# Patient Record
Sex: FEMALE | Race: OTHER | HISPANIC OR LATINO | URBAN - METROPOLITAN AREA
[De-identification: names, ages, dates, MRNs, and addresses within clinical notes are randomized per-mention and may not be internally consistent; named-entity substitution may affect disease eponyms.]

---

## 2021-08-14 ENCOUNTER — EMERGENCY (EMERGENCY)
Facility: HOSPITAL | Age: 21
LOS: 1 days | Discharge: HOME | End: 2021-08-14
Attending: EMERGENCY MEDICINE
Payer: SELF-PAY

## 2021-08-14 VITALS
DIASTOLIC BLOOD PRESSURE: 69 MMHG | RESPIRATION RATE: 14 BRPM | HEART RATE: 76 BPM | SYSTOLIC BLOOD PRESSURE: 100 MMHG | TEMPERATURE: 98 F | OXYGEN SATURATION: 99 %

## 2021-08-14 VITALS
DIASTOLIC BLOOD PRESSURE: 67 MMHG | SYSTOLIC BLOOD PRESSURE: 105 MMHG | RESPIRATION RATE: 12 BRPM | HEART RATE: 80 BPM | TEMPERATURE: 98 F | OXYGEN SATURATION: 98 %

## 2021-08-14 LAB
APPEARANCE UR: CLEAR — SIGNIFICANT CHANGE UP
BACTERIA # UR AUTO: ABNORMAL
BASOPHILS # BLD AUTO: 0.06 K/UL — SIGNIFICANT CHANGE UP (ref 0–0.2)
BASOPHILS NFR BLD AUTO: 0.6 % — SIGNIFICANT CHANGE UP (ref 0–2)
BILIRUB UR-MCNC: NEGATIVE — SIGNIFICANT CHANGE UP
COLOR SPEC: YELLOW — SIGNIFICANT CHANGE UP
COMMENT - URINE: SIGNIFICANT CHANGE UP
DIFF PNL FLD: ABNORMAL
EOSINOPHIL # BLD AUTO: 0.16 K/UL — SIGNIFICANT CHANGE UP (ref 0–0.5)
EOSINOPHIL NFR BLD AUTO: 1.5 % — SIGNIFICANT CHANGE UP (ref 0–6)
EPI CELLS # UR: ABNORMAL
GLUCOSE UR QL: NEGATIVE MG/DL — SIGNIFICANT CHANGE UP
HCT VFR BLD CALC: 40.1 % — SIGNIFICANT CHANGE UP (ref 34.5–45)
HGB BLD-MCNC: 13.2 G/DL — SIGNIFICANT CHANGE UP (ref 11.5–15.5)
IMM GRANULOCYTES NFR BLD AUTO: 0.3 % — SIGNIFICANT CHANGE UP (ref 0–1.5)
KETONES UR-MCNC: NEGATIVE — SIGNIFICANT CHANGE UP
LEUKOCYTE ESTERASE UR-ACNC: ABNORMAL
LYMPHOCYTES # BLD AUTO: 2.17 K/UL — SIGNIFICANT CHANGE UP (ref 1–3.3)
LYMPHOCYTES # BLD AUTO: 20.8 % — SIGNIFICANT CHANGE UP (ref 13–44)
MCHC RBC-ENTMCNC: 28 PG — SIGNIFICANT CHANGE UP (ref 27–34)
MCHC RBC-ENTMCNC: 32.9 GM/DL — SIGNIFICANT CHANGE UP (ref 32–36)
MCV RBC AUTO: 85 FL — SIGNIFICANT CHANGE UP (ref 80–100)
MONOCYTES # BLD AUTO: 0.72 K/UL — SIGNIFICANT CHANGE UP (ref 0–0.9)
MONOCYTES NFR BLD AUTO: 6.9 % — SIGNIFICANT CHANGE UP (ref 2–14)
NEUTROPHILS # BLD AUTO: 7.27 K/UL — SIGNIFICANT CHANGE UP (ref 1.8–7.4)
NEUTROPHILS NFR BLD AUTO: 69.9 % — SIGNIFICANT CHANGE UP (ref 43–77)
NITRITE UR-MCNC: NEGATIVE — SIGNIFICANT CHANGE UP
PH UR: 7 — SIGNIFICANT CHANGE UP (ref 5–8)
PLATELET # BLD AUTO: 189 K/UL — SIGNIFICANT CHANGE UP (ref 150–400)
PROT UR-MCNC: NEGATIVE MG/DL — SIGNIFICANT CHANGE UP
RBC # BLD: 4.72 M/UL — SIGNIFICANT CHANGE UP (ref 3.8–5.2)
RBC # FLD: 13.2 % — SIGNIFICANT CHANGE UP (ref 10.3–14.5)
RBC CASTS # UR COMP ASSIST: SIGNIFICANT CHANGE UP /HPF (ref 0–4)
SP GR SPEC: 1 — LOW (ref 1.01–1.02)
UROBILINOGEN FLD QL: NEGATIVE MG/DL — SIGNIFICANT CHANGE UP
WBC # BLD: 10.41 K/UL — SIGNIFICANT CHANGE UP (ref 3.8–10.5)
WBC # FLD AUTO: 10.41 K/UL — SIGNIFICANT CHANGE UP (ref 3.8–10.5)
WBC UR QL: ABNORMAL

## 2021-08-14 PROCEDURE — 36415 COLL VENOUS BLD VENIPUNCTURE: CPT

## 2021-08-14 PROCEDURE — 85025 COMPLETE CBC W/AUTO DIFF WBC: CPT

## 2021-08-14 PROCEDURE — 76817 TRANSVAGINAL US OBSTETRIC: CPT | Mod: 26

## 2021-08-14 PROCEDURE — 96375 TX/PRO/DX INJ NEW DRUG ADDON: CPT

## 2021-08-14 PROCEDURE — 99285 EMERGENCY DEPT VISIT HI MDM: CPT

## 2021-08-14 PROCEDURE — 76817 TRANSVAGINAL US OBSTETRIC: CPT

## 2021-08-14 PROCEDURE — 99284 EMERGENCY DEPT VISIT MOD MDM: CPT | Mod: 25

## 2021-08-14 PROCEDURE — 87086 URINE CULTURE/COLONY COUNT: CPT

## 2021-08-14 PROCEDURE — 96374 THER/PROPH/DIAG INJ IV PUSH: CPT

## 2021-08-14 PROCEDURE — 76801 OB US < 14 WKS SINGLE FETUS: CPT | Mod: 26

## 2021-08-14 PROCEDURE — 76801 OB US < 14 WKS SINGLE FETUS: CPT

## 2021-08-14 PROCEDURE — 81001 URINALYSIS AUTO W/SCOPE: CPT

## 2021-08-14 RX ORDER — CEFTRIAXONE 500 MG/1
1000 INJECTION, POWDER, FOR SOLUTION INTRAMUSCULAR; INTRAVENOUS ONCE
Refills: 0 | Status: COMPLETED | OUTPATIENT
Start: 2021-08-14 | End: 2021-08-14

## 2021-08-14 RX ORDER — CEPHALEXIN 500 MG
1 CAPSULE ORAL
Qty: 40 | Refills: 0
Start: 2021-08-14 | End: 2021-08-23

## 2021-08-14 RX ORDER — METOCLOPRAMIDE HCL 10 MG
10 TABLET ORAL ONCE
Refills: 0 | Status: COMPLETED | OUTPATIENT
Start: 2021-08-14 | End: 2021-08-14

## 2021-08-14 RX ORDER — METOCLOPRAMIDE HCL 10 MG
1 TABLET ORAL
Qty: 15 | Refills: 0
Start: 2021-08-14 | End: 2021-08-28

## 2021-08-14 RX ORDER — SODIUM CHLORIDE 9 MG/ML
1000 INJECTION INTRAMUSCULAR; INTRAVENOUS; SUBCUTANEOUS ONCE
Refills: 0 | Status: COMPLETED | OUTPATIENT
Start: 2021-08-14 | End: 2021-08-14

## 2021-08-14 RX ADMIN — SODIUM CHLORIDE 1000 MILLILITER(S): 9 INJECTION INTRAMUSCULAR; INTRAVENOUS; SUBCUTANEOUS at 17:58

## 2021-08-14 RX ADMIN — CEFTRIAXONE 100 MILLIGRAM(S): 500 INJECTION, POWDER, FOR SOLUTION INTRAMUSCULAR; INTRAVENOUS at 20:31

## 2021-08-14 RX ADMIN — Medication 10 MILLIGRAM(S): at 17:58

## 2021-08-14 NOTE — ED ADULT TRIAGE NOTE - LANGUAGE ASSISTANCE NEEDED
Episode closed: no hospitalization or ED admission post 30 days from discharge of 12/28/2017. Goals Addressed             Most Recent     COMPLETED: Coordinate Pain Management Plan for Patient. On track (1/29/2018)             Maintain an acceptable pain level       COMPLETED: Develop action plan for Self-Management Chronic Disease. On track (1/29/2018)             Wound care        COMPLETED: Understands red flags post discharge.    On track (1/29/2018)             Monitor for signs and symptoms of infection Yes-Patient/Caregiver accepts free interpretation services...

## 2021-08-14 NOTE — ED STATDOCS - NS ED ROS FT
Review of Systems  •	CONSTITUTIONAL - no  fever, no diaphoresis, no weight change  •	SKIN - no rash  •	HEMATOLOGIC - no bleeding, no bruising  •	EYES - no eye pain, no blurred vision  •	ENT - no change in hearing, no pain  •	RESPIRATORY - no shortness of breath, no cough  •	CARDIAC - no chest pain, no palpitations  •	GI - no abd pain, (+) nausea, (+) vomiting, no diarrhea, no constipation, no bleeding  •	GENITO-URINARY - no discharge, no dysuria; no hematuria,   •	ENDO - no polydipsia, no polyuria, no heat/no cold intolerance  •	MUSCULOSKELETAL - no joint pain, no swelling, no redness  •	NEUROLOGIC - no weakness, no headache, no anesthesia, no paresthesias  •	PSYCH - no anxiety, non suicidal, non homicidal, no hallucination, no depression

## 2021-08-14 NOTE — ED ADULT TRIAGE NOTE - CHIEF COMPLAINT QUOTE
Patient A&Ox4 complaining of nausea & vomiting. Stated is 3 weeks pregnant. Negative vomiting during triage.

## 2021-08-14 NOTE — ED STATDOCS - OBJECTIVE STATEMENT
20 y/o female with no PMHx of presents to ED c/o nausea and vomiting for 3 days. Patient is A0 @ 3 weeks pregnant, did a home pregnancy test 3 days ago and has not seen an OBGYN yet. Patient is endorsing 3 days of nausea and vomiting.     Denies: vaginal, bleeding, dysuria, surgeries, allergies abdominal pain  LNMP: 1 month ago  : Bhargav

## 2021-08-14 NOTE — ED STATDOCS - CLINICAL SUMMARY MEDICAL DECISION MAKING FREE TEXT BOX
Patient possibly 3 weeks pregnant, did a home pregnancy test, no OB appointment, now with persistent N/V, no vaginal bleed. Will give IV fluid for hydration, do pelvic ultrasound, blood work, urine, and HCG tests.

## 2021-08-14 NOTE — ED STATDOCS - ATTENDING CONTRIBUTION TO CARE
I, Jhony Dykes, performed the initial face to face bedside interview with this patient regarding history of present illness, review of symptoms and relevant past medical, social and family history.  I completed an independent physical examination.  I was the initial provider who evaluated this patient. I have signed out the follow up of any pending tests (i.e. labs, radiological studies) to the ACP.  I have communicated the patient’s plan of care and disposition with the ACP.

## 2021-08-16 LAB
CULTURE RESULTS: SIGNIFICANT CHANGE UP
SPECIMEN SOURCE: SIGNIFICANT CHANGE UP

## 2021-11-17 ENCOUNTER — ASOB RESULT (OUTPATIENT)
Age: 21
End: 2021-11-17

## 2021-11-17 ENCOUNTER — APPOINTMENT (OUTPATIENT)
Dept: ANTEPARTUM | Facility: CLINIC | Age: 21
End: 2021-11-17
Payer: MEDICAID

## 2021-11-17 PROCEDURE — 76805 OB US >/= 14 WKS SNGL FETUS: CPT

## 2022-03-07 ENCOUNTER — EMERGENCY (EMERGENCY)
Facility: HOSPITAL | Age: 22
LOS: 1 days | Discharge: HOME | End: 2022-03-07
Attending: EMERGENCY MEDICINE
Payer: COMMERCIAL

## 2022-03-07 VITALS
OXYGEN SATURATION: 98 % | SYSTOLIC BLOOD PRESSURE: 126 MMHG | HEIGHT: 63 IN | TEMPERATURE: 98 F | DIASTOLIC BLOOD PRESSURE: 86 MMHG | RESPIRATION RATE: 20 BRPM | HEART RATE: 92 BPM | WEIGHT: 135.58 LBS

## 2022-03-07 PROCEDURE — 99284 EMERGENCY DEPT VISIT MOD MDM: CPT

## 2022-03-07 PROCEDURE — 99282 EMERGENCY DEPT VISIT SF MDM: CPT

## 2022-03-07 RX ORDER — DIPHENHYDRAMINE HCL 50 MG
25 CAPSULE ORAL ONCE
Refills: 0 | Status: COMPLETED | OUTPATIENT
Start: 2022-03-07 | End: 2022-03-07

## 2022-03-07 RX ORDER — DIPHENHYDRAMINE HCL 50 MG
1 CAPSULE ORAL
Qty: 15 | Refills: 0
Start: 2022-03-07 | End: 2022-03-11

## 2022-03-07 RX ADMIN — Medication 25 MILLIGRAM(S): at 15:59

## 2022-03-07 NOTE — ED PROVIDER NOTE - BIRTH SEX
Faxed order, which doid show referral currently open. Spoke with pt to advise order is faxed. Female

## 2022-03-07 NOTE — ED ADULT TRIAGE NOTE - CHIEF COMPLAINT QUOTE
Pt arrives to ED c/o allergic reaction/ rash  to " entire body " since yesterday - pt is 35 weeks pregnant - denies any bleeding or cramping

## 2022-03-07 NOTE — ED PROVIDER NOTE - NS ED ROS FT
No fever/chills, No photophobia/eye pain/changes in vision, No ear pain/sore throat/dysphagia, No chest pain/palpitations, no SOB/cough/wheeze/stridor, No abdominal pain, No N/V/D, no dysuria/frequency/discharge, No neck/back pain, +rash, no changes in neurological status/function.

## 2022-03-07 NOTE — ED PROVIDER NOTE - CARE PROVIDER_API CALL
Lala Flores)  Dermatology  [address] [place] [address]  Phone: [phone]  Fax: [phone]  Follow Up Time:

## 2022-03-07 NOTE — ED PROVIDER NOTE - PHYSICAL EXAMINATION
Constitutional - well-developed; well nourished. Head - NCAT. Airway patent. Neuro - A&Ox3. strength 5/5 x4. sensation intact x4. normal gait. Skin - flat <1mm linear dermatitis to b/l forearms and b/l thighs. MSK - normal ROM.

## 2022-03-07 NOTE — ED PROVIDER NOTE - OBJECTIVE STATEMENT
This is a 21 year old female 35 weeks pregnant c/o rash to body x 2 days.  She reports rash is itchy in nature.  She denies any new skin creams or detergents or new food intake or medications.  She has not taken any medication PTA for current symptoms.  She denies any fevers, chills, n/v/d or any abdominal pain, recent travel or rashes.

## 2022-03-07 NOTE — ED PROVIDER NOTE - ATTENDING CONTRIBUTION TO CARE
rash to arms/ legs with itch that is more intense at night for past 2 days. no fever.  PE: nontoxic appearing with erythematous patches to bilateral arms and legs in a somewhat linear configuration without secondary scaling/ crusting or vesiculation.  A/P non-specific dermatitis, possible PUPP: antihistamines for itch and OB follow-up recommended

## 2022-03-14 ENCOUNTER — APPOINTMENT (OUTPATIENT)
Dept: ANTEPARTUM | Facility: CLINIC | Age: 22
End: 2022-03-14
Payer: MEDICAID

## 2022-03-14 ENCOUNTER — ASOB RESULT (OUTPATIENT)
Age: 22
End: 2022-03-14

## 2022-03-14 PROBLEM — Z00.00 ENCOUNTER FOR PREVENTIVE HEALTH EXAMINATION: Status: ACTIVE | Noted: 2022-03-14

## 2022-03-14 PROCEDURE — 76819 FETAL BIOPHYS PROFIL W/O NST: CPT

## 2022-03-14 PROCEDURE — 76816 OB US FOLLOW-UP PER FETUS: CPT

## 2022-03-31 ENCOUNTER — OUTPATIENT (OUTPATIENT)
Dept: INPATIENT UNIT | Facility: HOSPITAL | Age: 22
LOS: 1 days | End: 2022-03-31
Payer: COMMERCIAL

## 2022-03-31 VITALS — DIASTOLIC BLOOD PRESSURE: 78 MMHG | SYSTOLIC BLOOD PRESSURE: 112 MMHG | HEART RATE: 81 BPM

## 2022-03-31 VITALS — DIASTOLIC BLOOD PRESSURE: 86 MMHG | HEART RATE: 88 BPM | SYSTOLIC BLOOD PRESSURE: 130 MMHG

## 2022-03-31 DIAGNOSIS — O47.1 FALSE LABOR AT OR AFTER 37 COMPLETED WEEKS OF GESTATION: ICD-10-CM

## 2022-03-31 PROCEDURE — 59025 FETAL NON-STRESS TEST: CPT

## 2022-03-31 PROCEDURE — G0463: CPT

## 2022-03-31 NOTE — OB PROVIDER TRIAGE NOTE - NSGENETICTESTING_OBGYN_ALL_OB
I will START or STAY ON the medications listed below when I get home from the hospital:  None
Not applicable

## 2022-03-31 NOTE — OB PROVIDER TRIAGE NOTE - NSICDXFAMILYHX_GEN_ALL_CORE_FT
FAMILY HISTORY:  Mother  Still living? Yes, Estimated age: Age Unknown  FH: hypertension, Age at diagnosis: Age Unknown

## 2022-03-31 NOTE — OB PROVIDER TRIAGE NOTE - NSOBPROVIDERNOTE_OBGYN_ALL_OB_FT
21yy  at 39 weeks and 1 day presenting in possible latent labor.        -Admit to L&D.  -Routine labs  -IV Fluids  -Expectant management  -GbS status unknown  -Fetus: Cat 1 tracing. Continuous EFM. Sono.  -Pain: IV pain meds/epidural PRN 21yy  at 39 weeks and 1 day presenting in early labor.        -IV Fluids  -Expectant management  -GbS status unknown  -Fetus: Cat 1 tracing. Continuous EFM. Sono.  - discharge and education on when to return due to increased contraction frequency or LOF 21yy  at 39 weeks and 1 day presenting in early labor.      - Expectant management  - GBS status unknown  - Fetus: Cat 1 tracing. Continuous EFM. Sono.  - discharge and education on when to return due to increased contraction frequency or LOF    d/w Dr. Montenegro

## 2022-03-31 NOTE — OB PROVIDER TRIAGE NOTE - HISTORY OF PRESENT ILLNESS
Patient is a 21 year old female  at 39 weeks and 1 day presenting with crampy intermittent abdominal pain along with occasional spotting since this morning. Patient notes this morning around 2 am she noticed bloody mucus on her toilet paper after going to the bathroom. She denies any vaginal pain or odor. She reports the crampy intermittent abdominal pain is localized in her lower abdomen along with right sided back pain around 6 am and occurs every 2 to 3 minutes. She describes the pain as a 4/5 out of 10 with no alleviating factors. She does note around 10 am she started having contractions every 6 minutes which did worsen the abdominal pain. She notes while on her way to the hospital the contraction slowed down to every 10 minutes. She denies any LOF. Patient denies any nausea, vomiting, diarrhea, or constipation.       LMP: 2021  RISHABH: 2022      Pregnancy course:  Anemia     OB Hx: primigravida   GYN Hx:  - ovarian cysts 2018  - denies fibroids, abdnormal pap smears   PMH: denies   PSH: denies   FH: mother has HTN  Meds: PNV, iron   All: none  Social Hx: denies alcohol, tobacco, or illicit drug use    Patient is a 21 year old female  at 39 weeks and 1 day presenting with crampy intermittent abdominal pain along with occasional spotting since this morning. Patient notes this morning around 2 am she noticed bloody mucus on her toilet paper after going to the bathroom. She denies any vaginal pain or odor. She reports the crampy intermittent abdominal pain is localized in her lower abdomen along with right sided back pain around 6 am and occurs every 2 to 3 minutes. She describes the pain as a 4/5 out of 10 with no alleviating factors. She does note around 10 am she started having contractions every 6 minutes which did worsen the abdominal pain. She notes while on her way to the hospital the contraction slowed down to every 10 minutes. She denies any LOF. Patient denies any nausea, vomiting, diarrhea, or constipation.     LMP: 2021  RISHABH: 2022      Pregnancy course:  Anemia     OB Hx: primigravida   GYN Hx:  - ovarian cysts 2018  - denies fibroids, abdnormal pap smears   PMH: denies   PSH: denies   FH: mother has HTN  Meds: PNV, iron   All: none  Social Hx: denies alcohol, tobacco, or illicit drug use

## 2022-03-31 NOTE — OB PROVIDER TRIAGE NOTE - NSHPPHYSICALEXAM_GEN_ALL_CORE
Vital Signs  T(C): 36.9 (31 Mar 2022 15:55), Max: 36.9 (31 Mar 2022 15:55)  T(F): 98.4 (31 Mar 2022 15:55), Max: 98.4 (31 Mar 2022 15:55)  HR: 79 (31 Mar 2022 16:36) (79 - 100)  BP: 116/77 (31 Mar 2022 16:36) (107/86 - 130/86)  RR: 17 (31 Mar 2022 15:55) (17 - 17)    General: Alert and oriented x3, no acute distress   Cardiovascular: regular rate and rhythm  Respiratory: no acute respiratory distress  Abdominal: gravid uterus, lower abdomen tender to palpation  Pelvic:  Extremities: no redness, tenderness or swelling in lower extremities bilaterally     FHT: baseline 140bpm, moderate variability, +accels, -deccels  [place]: moderate contractions   US: Vital Signs  T(C): 36.9 (31 Mar 2022 15:55), Max: 36.9 (31 Mar 2022 15:55)  T(F): 98.4 (31 Mar 2022 15:55), Max: 98.4 (31 Mar 2022 15:55)  HR: 79 (31 Mar 2022 16:36) (79 - 100)  BP: 116/77 (31 Mar 2022 16:36) (107/86 - 130/86)  RR: 17 (31 Mar 2022 15:55) (17 - 17)    General: Alert and oriented x3, no acute distress   Cardiovascular: regular rate and rhythm  Respiratory: no acute respiratory distress  Abdominal: gravid uterus, lower abdomen tender to palpation  Pelvic: 1.5 dilated, 0 effaced, -3 station  Extremities: no redness, tenderness or swelling in lower extremities bilaterally     FHT: baseline 140bpm, moderate variability, +accels, -deccels  [place]: moderate contractions

## 2022-04-01 ENCOUNTER — TRANSCRIPTION ENCOUNTER (OUTPATIENT)
Age: 22
End: 2022-04-01

## 2022-04-01 ENCOUNTER — INPATIENT (INPATIENT)
Facility: HOSPITAL | Age: 22
LOS: 1 days | Discharge: HOME | End: 2022-04-03
Attending: OBSTETRICS & GYNECOLOGY | Admitting: OBSTETRICS & GYNECOLOGY
Payer: COMMERCIAL

## 2022-04-01 VITALS — HEART RATE: 88 BPM | SYSTOLIC BLOOD PRESSURE: 127 MMHG | DIASTOLIC BLOOD PRESSURE: 85 MMHG

## 2022-04-01 DIAGNOSIS — O47.1 FALSE LABOR AT OR AFTER 37 COMPLETED WEEKS OF GESTATION: ICD-10-CM

## 2022-04-01 DIAGNOSIS — O26.893 OTHER SPECIFIED PREGNANCY RELATED CONDITIONS, THIRD TRIMESTER: ICD-10-CM

## 2022-04-01 LAB
BASOPHILS # BLD AUTO: 0.03 K/UL — SIGNIFICANT CHANGE UP (ref 0–0.2)
BASOPHILS NFR BLD AUTO: 0.3 % — SIGNIFICANT CHANGE UP (ref 0–2)
BLD GP AB SCN SERPL QL: SIGNIFICANT CHANGE UP
COVID-19 SPIKE DOMAIN AB INTERP: POSITIVE
COVID-19 SPIKE DOMAIN ANTIBODY RESULT: >250 U/ML — HIGH
EOSINOPHIL # BLD AUTO: 0.05 K/UL — SIGNIFICANT CHANGE UP (ref 0–0.5)
EOSINOPHIL NFR BLD AUTO: 0.5 % — SIGNIFICANT CHANGE UP (ref 0–6)
HCT VFR BLD CALC: 35.1 % — SIGNIFICANT CHANGE UP (ref 34.5–45)
HGB BLD-MCNC: 11.5 G/DL — SIGNIFICANT CHANGE UP (ref 11.5–15.5)
HIV 1 & 2 AB SERPL IA.RAPID: SIGNIFICANT CHANGE UP
HIV 1+2 AB+HIV1 P24 AG SERPL QL IA: SIGNIFICANT CHANGE UP
IMM GRANULOCYTES NFR BLD AUTO: 0.7 % — SIGNIFICANT CHANGE UP (ref 0–1.5)
LYMPHOCYTES # BLD AUTO: 1.31 K/UL — SIGNIFICANT CHANGE UP (ref 1–3.3)
LYMPHOCYTES # BLD AUTO: 11.9 % — LOW (ref 13–44)
MCHC RBC-ENTMCNC: 26 PG — LOW (ref 27–34)
MCHC RBC-ENTMCNC: 32.8 GM/DL — SIGNIFICANT CHANGE UP (ref 32–36)
MCV RBC AUTO: 79.2 FL — LOW (ref 80–100)
MEV IGG SER-ACNC: 92.2 AU/ML — SIGNIFICANT CHANGE UP
MEV IGG+IGM SER-IMP: POSITIVE — SIGNIFICANT CHANGE UP
MONOCYTES # BLD AUTO: 0.41 K/UL — SIGNIFICANT CHANGE UP (ref 0–0.9)
MONOCYTES NFR BLD AUTO: 3.7 % — SIGNIFICANT CHANGE UP (ref 2–14)
NEUTROPHILS # BLD AUTO: 9.12 K/UL — HIGH (ref 1.8–7.4)
NEUTROPHILS NFR BLD AUTO: 82.9 % — HIGH (ref 43–77)
PLATELET # BLD AUTO: 232 K/UL — SIGNIFICANT CHANGE UP (ref 150–400)
RBC # BLD: 4.43 M/UL — SIGNIFICANT CHANGE UP (ref 3.8–5.2)
RBC # FLD: 14.1 % — SIGNIFICANT CHANGE UP (ref 10.3–14.5)
SARS-COV-2 IGG+IGM SERPL QL IA: >250 U/ML — HIGH
SARS-COV-2 IGG+IGM SERPL QL IA: POSITIVE
SARS-COV-2 RNA SPEC QL NAA+PROBE: SIGNIFICANT CHANGE UP
T PALLIDUM AB TITR SER: NEGATIVE — SIGNIFICANT CHANGE UP
WBC # BLD: 11 K/UL — HIGH (ref 3.8–10.5)
WBC # FLD AUTO: 11 K/UL — HIGH (ref 3.8–10.5)

## 2022-04-01 RX ORDER — OXYCODONE HYDROCHLORIDE 5 MG/1
5 TABLET ORAL
Refills: 0 | Status: DISCONTINUED | OUTPATIENT
Start: 2022-04-01 | End: 2022-04-03

## 2022-04-01 RX ORDER — LANOLIN
1 OINTMENT (GRAM) TOPICAL EVERY 6 HOURS
Refills: 0 | Status: DISCONTINUED | OUTPATIENT
Start: 2022-04-01 | End: 2022-04-03

## 2022-04-01 RX ORDER — SODIUM CHLORIDE 9 MG/ML
3 INJECTION INTRAMUSCULAR; INTRAVENOUS; SUBCUTANEOUS EVERY 8 HOURS
Refills: 0 | Status: DISCONTINUED | OUTPATIENT
Start: 2022-04-01 | End: 2022-04-03

## 2022-04-01 RX ORDER — AMPICILLIN TRIHYDRATE 250 MG
1 CAPSULE ORAL EVERY 4 HOURS
Refills: 0 | Status: DISCONTINUED | OUTPATIENT
Start: 2022-04-01 | End: 2022-04-01

## 2022-04-01 RX ORDER — PRAMOXINE HYDROCHLORIDE 150 MG/15G
1 AEROSOL, FOAM RECTAL EVERY 4 HOURS
Refills: 0 | Status: DISCONTINUED | OUTPATIENT
Start: 2022-04-01 | End: 2022-04-03

## 2022-04-01 RX ORDER — OXYTOCIN 10 UNIT/ML
333.33 VIAL (ML) INJECTION
Qty: 20 | Refills: 0 | Status: DISCONTINUED | OUTPATIENT
Start: 2022-04-01 | End: 2022-04-03

## 2022-04-01 RX ORDER — AER TRAVELER 0.5 G/1
1 SOLUTION RECTAL; TOPICAL EVERY 4 HOURS
Refills: 0 | Status: DISCONTINUED | OUTPATIENT
Start: 2022-04-01 | End: 2022-04-03

## 2022-04-01 RX ORDER — DIBUCAINE 1 %
1 OINTMENT (GRAM) RECTAL EVERY 6 HOURS
Refills: 0 | Status: DISCONTINUED | OUTPATIENT
Start: 2022-04-01 | End: 2022-04-03

## 2022-04-01 RX ORDER — TETANUS TOXOID, REDUCED DIPHTHERIA TOXOID AND ACELLULAR PERTUSSIS VACCINE, ADSORBED 5; 2.5; 8; 8; 2.5 [IU]/.5ML; [IU]/.5ML; UG/.5ML; UG/.5ML; UG/.5ML
0.5 SUSPENSION INTRAMUSCULAR ONCE
Refills: 0 | Status: DISCONTINUED | OUTPATIENT
Start: 2022-04-01 | End: 2022-04-03

## 2022-04-01 RX ORDER — SODIUM CHLORIDE 9 MG/ML
1000 INJECTION, SOLUTION INTRAVENOUS
Refills: 0 | Status: DISCONTINUED | OUTPATIENT
Start: 2022-04-01 | End: 2022-04-01

## 2022-04-01 RX ORDER — CITRIC ACID/SODIUM CITRATE 300-500 MG
30 SOLUTION, ORAL ORAL ONCE
Refills: 0 | Status: DISCONTINUED | OUTPATIENT
Start: 2022-04-01 | End: 2022-04-01

## 2022-04-01 RX ORDER — DIPHENHYDRAMINE HCL 50 MG
25 CAPSULE ORAL EVERY 6 HOURS
Refills: 0 | Status: DISCONTINUED | OUTPATIENT
Start: 2022-04-01 | End: 2022-04-03

## 2022-04-01 RX ORDER — KETOROLAC TROMETHAMINE 30 MG/ML
30 SYRINGE (ML) INJECTION ONCE
Refills: 0 | Status: DISCONTINUED | OUTPATIENT
Start: 2022-04-01 | End: 2022-04-01

## 2022-04-01 RX ORDER — AMPICILLIN TRIHYDRATE 250 MG
2 CAPSULE ORAL ONCE
Refills: 0 | Status: COMPLETED | OUTPATIENT
Start: 2022-04-01 | End: 2022-04-01

## 2022-04-01 RX ORDER — HYDROCORTISONE 1 %
1 OINTMENT (GRAM) TOPICAL EVERY 6 HOURS
Refills: 0 | Status: DISCONTINUED | OUTPATIENT
Start: 2022-04-01 | End: 2022-04-03

## 2022-04-01 RX ORDER — IBUPROFEN 200 MG
600 TABLET ORAL EVERY 6 HOURS
Refills: 0 | Status: DISCONTINUED | OUTPATIENT
Start: 2022-04-01 | End: 2022-04-03

## 2022-04-01 RX ORDER — SIMETHICONE 80 MG/1
80 TABLET, CHEWABLE ORAL EVERY 4 HOURS
Refills: 0 | Status: DISCONTINUED | OUTPATIENT
Start: 2022-04-01 | End: 2022-04-03

## 2022-04-01 RX ORDER — BENZOCAINE 10 %
1 GEL (GRAM) MUCOUS MEMBRANE EVERY 6 HOURS
Refills: 0 | Status: DISCONTINUED | OUTPATIENT
Start: 2022-04-01 | End: 2022-04-03

## 2022-04-01 RX ORDER — IBUPROFEN 200 MG
600 TABLET ORAL EVERY 6 HOURS
Refills: 0 | Status: COMPLETED | OUTPATIENT
Start: 2022-04-01 | End: 2023-02-28

## 2022-04-01 RX ORDER — OXYCODONE HYDROCHLORIDE 5 MG/1
5 TABLET ORAL ONCE
Refills: 0 | Status: DISCONTINUED | OUTPATIENT
Start: 2022-04-01 | End: 2022-04-03

## 2022-04-01 RX ORDER — MAGNESIUM HYDROXIDE 400 MG/1
30 TABLET, CHEWABLE ORAL
Refills: 0 | Status: DISCONTINUED | OUTPATIENT
Start: 2022-04-01 | End: 2022-04-03

## 2022-04-01 RX ORDER — ACETAMINOPHEN 500 MG
975 TABLET ORAL
Refills: 0 | Status: DISCONTINUED | OUTPATIENT
Start: 2022-04-01 | End: 2022-04-03

## 2022-04-01 RX ADMIN — Medication 975 MILLIGRAM(S): at 20:55

## 2022-04-01 RX ADMIN — Medication 1 TABLET(S): at 13:21

## 2022-04-01 RX ADMIN — SODIUM CHLORIDE 3 MILLILITER(S): 9 INJECTION INTRAMUSCULAR; INTRAVENOUS; SUBCUTANEOUS at 21:53

## 2022-04-01 RX ADMIN — Medication 30 MILLIGRAM(S): at 07:15

## 2022-04-01 RX ADMIN — Medication 200 GRAM(S): at 02:54

## 2022-04-01 RX ADMIN — SODIUM CHLORIDE 3 MILLILITER(S): 9 INJECTION INTRAMUSCULAR; INTRAVENOUS; SUBCUTANEOUS at 14:00

## 2022-04-01 RX ADMIN — Medication 1000 MILLIUNIT(S)/MIN: at 05:03

## 2022-04-01 RX ADMIN — Medication 975 MILLIGRAM(S): at 15:25

## 2022-04-01 RX ADMIN — Medication 975 MILLIGRAM(S): at 08:46

## 2022-04-01 RX ADMIN — Medication 600 MILLIGRAM(S): at 19:06

## 2022-04-01 RX ADMIN — Medication 30 MILLIGRAM(S): at 06:12

## 2022-04-01 RX ADMIN — SODIUM CHLORIDE 125 MILLILITER(S): 9 INJECTION, SOLUTION INTRAVENOUS at 02:55

## 2022-04-01 RX ADMIN — Medication 600 MILLIGRAM(S): at 23:24

## 2022-04-01 NOTE — DISCHARGE NOTE OB - MEDICATION SUMMARY - MEDICATIONS TO TAKE
I will START or STAY ON the medications listed below when I get home from the hospital:    acetaminophen 325 mg oral tablet  -- 2 tab(s) by mouth every 6 hours, As Needed -for moderate pain   -- This product contains acetaminophen.  Do not use  with any other product containing acetaminophen to prevent possible liver damage.    -- Indication: For mild pain    ibuprofen 600 mg oral tablet  -- 1 tab(s) by mouth every 6 hours, As Needed -for severe pain   -- Do not take this drug if you are pregnant.  It is very important that you take or use this exactly as directed.  Do not skip doses or discontinue unless directed by your doctor.  May cause drowsiness or dizziness.  Obtain medical advice before taking any non-prescription drugs as some may affect the action of this medication.  Take with food or milk.    -- Indication: For moderate pain

## 2022-04-01 NOTE — DISCHARGE NOTE OB - CARE PLAN
1 Principal Discharge DX:	 (normal spontaneous vaginal delivery)  Assessment and plan of treatment:	Patient can transition to regular activity level and continue regular diet. Patient should follow up with [facility] to schedule post partum visit. Patient should contact doctor earlier should she develop persistent/increased vaginal bleeding or fever.

## 2022-04-01 NOTE — DISCHARGE NOTE OB - CARE PROVIDER_API CALL
Tamara Falk)  Obstetrics and Gynecology  [address] [place] [address]  Phone: [phone]  Fax: [phone]  Follow Up Time:

## 2022-04-01 NOTE — OB RN DELIVERY SUMMARY - NS_SEPSISRSKCALC_OBGYN_ALL_OB_FT
EOS calculated successfully. EOS Risk Factor: 0.5/1000 live births ([facility] national incidence); GA=39w2d; Temp=98.06; ROM=1.5; GBS='Positive'; Antibiotics='No antibiotics or any antibiotics < 2 hrs prior to birth'   EOS calculated successfully. EOS Risk Factor: 0.5/1000 live births ([facility] national incidence); GA=39w2d; Temp=99; ROM=1.5; GBS='Positive'; Antibiotics='GBS specific antibiotics > 2 hrs prior to birth'

## 2022-04-01 NOTE — DISCHARGE NOTE OB - HOSPITAL COURSE
20 y/o  now PPD#2 s/p normal spontaneous vaginal delivery. Patient transferred to post partum unit, uncomplicated hospital course. At the time of discharge patient was tolerating regular diet PO, ambulating, voiding, and having bowel movements and flatus. Pain well controlled with pain medications PRN.

## 2022-04-01 NOTE — OB RN DELIVERY SUMMARY - NSSELHIDDEN_OBGYN_ALL_OB_FT
[NS_[email or web address]_ALL_OB_FT:[id number]],[NS_DeliveryAssist1_OBGYN_ALL_OB_FT:[id number]],[NS_DeliveryRN_OBGYN_ALL_OB_FT:[id number]]

## 2022-04-01 NOTE — OB PROVIDER DELIVERY SUMMARY - NSPROVIDERDELIVERYNOTE_OBGYN_ALL_OB_FT
21y  presenting in labor at 39w2d.     at 0500 of a live female infant with Apgars 9/9. Delivered JAKI, no nuchal cord, clear fluid. Infant's head delivered with maternal expulsive efforts. Shoulders delivered without difficulty followed by the rest of the body. Cord clamped and cut. Samples obtained. Baby handed to patient. Placenta delivered spontaneously, intact at 0503. Pitocin started. Excellent hemostasis achieved. Cervix, perineum and vagina were inspected and a second degree laceration and right labial laceration was noted and repaired under local anesthesia with chromic suture. EBL 89cc. Pt tolerated procedure well, in stable condition, recovering in LDR. Infant in LDR. Instrument/sponge count correct x 2 and confirmed by nurse.

## 2022-04-01 NOTE — OB RN PATIENT PROFILE - FALL HARM RISK - UNIVERSAL INTERVENTIONS
Bed in lowest position, wheels locked, appropriate side rails in place/Call bell, personal items and telephone in reach/Instruct patient to call for assistance before getting out of bed or chair/Non-slip footwear when patient is out of bed/[place] to call system/Physically safe environment - no spills, clutter or unnecessary equipment/Purposeful Proactive Rounding/Room/bathroom lighting operational, light cord in reach

## 2022-04-01 NOTE — OB PROVIDER H&P - ASSESSMENT
20yo  at 39w2d who is admitted to L&D in labor.  -Admit to L&D  -Consent  -Admission labs  -IV fluids  -Labor: Intact. Latent labor. Addy q2-3min.   -Fetus: Cat I tracing. Continuous toco and fetal monitoring.   -GBS: pos, amp orded  -Analgesia: desires epidural    Discussed with Dr. Tse

## 2022-04-01 NOTE — DISCHARGE NOTE OB - MEDICATION SUMMARY - MEDICATIONS TO STOP TAKING
I will STOP taking the medications listed below when I get home from the hospital:    Keflex 500 mg oral capsule  -- 1 cap(s) by mouth every 6 hours   -- Finish all this medication unless otherwise directed by prescriber.    Reglan 10 mg oral tablet  -- 1 tab(s) by mouth once a day   -- It is very important that you take or use this exactly as directed.  Do not skip doses or discontinue unless directed by your doctor.  May cause drowsiness or dizziness.  Take medication on an empty stomach 1 hour before or 2 to 3 hours after a meal unless otherwise directed by your doctor.    Benadryl 25 mg oral tablet  -- 1 tab(s) by mouth 3 times a day   -- May cause drowsiness.  Alcohol may intensify this effect.  Use care when operating dangerous machinery.  Obtain medical advice before taking any non-prescription drugs as some may affect the action of this medication.

## 2022-04-01 NOTE — OB PROVIDER H&P - PRO PRENATAL LABS ORI SOURCE HIV
Addended by: Chhaya Neves on: 2/17/2022 02:13 PM     Modules accepted: Orders hard copy, drawn during this pregnancy

## 2022-04-01 NOTE — DISCHARGE NOTE OB - PLAN OF CARE
Patient can transition to regular activity level and continue regular diet. Patient should follow up with [facility] to schedule post partum visit. Patient should contact doctor earlier should she develop persistent/increased vaginal bleeding or fever.

## 2022-04-01 NOTE — DISCHARGE NOTE OB - PATIENT PORTAL LINK FT
You can access the FollowMyHealth Patient Portal offered by [facility] by registering at the following website: [email or web address]. By joining [employer]’s FollowMyHealth portal, you will also be able to view your health information using other applications (apps) compatible with our system.

## 2022-04-01 NOTE — OB PROVIDER H&P - HISTORY OF PRESENT ILLNESS
Patient is a 21 year old female  at 39w2d who is admitted to L&D in labor at term. States CTX q6min, denies leakage of fluid or vaginal bleeding. Endorses good FM. Patient denies HA, changes in vision, SOB nausea, vomiting, diarrhea, or constipation.     LMP: 2021  RISHABH: 2022    Pregnancy course:  Anemia, on iron    OB Hx: denies  GYN Hx: hx of ovarian cysts. denies fibroids, STIs and abnormal paps   PMH: denies   PSH: denies   Meds: PNV, iron   All: NKDA  Social Hx: denies alcohol, tobacco, or illicit drug use in pregnancy

## 2022-04-01 NOTE — DISCHARGE NOTE OB - NS MD DC FALL RISK RISK
For information on Fall & Injury Prevention, visit: [email or web address] OR  [email or web address] OR  https://www.cdc.gov/steadi/patient.html

## 2022-04-01 NOTE — OB PROVIDER DELIVERY SUMMARY - NSSELHIDDEN_OBGYN_ALL_OB_FT
[NS_DeliveryAttending1_OBGYN_ALL_OB_FT:[id number]],[NS_DeliveryAssist1_OBGYN_ALL_OB_FT:[id number]],[NS_DeliveryRN_OBGYN_ALL_OB_FT:[id number]]

## 2022-04-01 NOTE — OB PROVIDER H&P - NSHPPHYSICALEXAM_GEN_ALL_CORE
Vitals  HR 88  /85  RR 16  Temp 36.7C    Gen: AAOx3  Abd: soft, gravid  Ext: no tenderness to calf palpation    Bedside US: VTX  SVE: 3/80/-2    FHT: baseline 160, moderate variability, + acels, -decels  [place]: CTX q2-3min
needs ob clearance

## 2022-04-02 LAB
HCT VFR BLD CALC: 30.1 % — LOW (ref 34.5–45)
HGB BLD-MCNC: 9.6 G/DL — LOW (ref 11.5–15.5)

## 2022-04-02 RX ADMIN — Medication 975 MILLIGRAM(S): at 09:03

## 2022-04-02 RX ADMIN — Medication 975 MILLIGRAM(S): at 02:34

## 2022-04-02 RX ADMIN — Medication 1 TABLET(S): at 12:20

## 2022-04-02 RX ADMIN — Medication 975 MILLIGRAM(S): at 21:36

## 2022-04-02 RX ADMIN — Medication 600 MILLIGRAM(S): at 12:20

## 2022-04-02 RX ADMIN — Medication 600 MILLIGRAM(S): at 05:03

## 2022-04-02 RX ADMIN — SODIUM CHLORIDE 3 MILLILITER(S): 9 INJECTION INTRAMUSCULAR; INTRAVENOUS; SUBCUTANEOUS at 05:38

## 2022-04-02 NOTE — PROGRESS NOTE ADULT - ASSESSMENT
A/P: Patient is a 21y  s/p   PPD1  - Stable, doing well postpartum  - Hgb 11.5 > f/u AM H&H  - Pain: well controlled on PO pain meds  - GI: Regular diet  - : voiding  - DVT prophylaxis: ambulate  - Dispo: Continue inpatient care

## 2022-04-03 VITALS
DIASTOLIC BLOOD PRESSURE: 72 MMHG | OXYGEN SATURATION: 98 % | HEART RATE: 66 BPM | TEMPERATURE: 98 F | SYSTOLIC BLOOD PRESSURE: 109 MMHG | RESPIRATION RATE: 16 BRPM

## 2022-04-03 PROCEDURE — 86850 RBC ANTIBODY SCREEN: CPT

## 2022-04-03 PROCEDURE — G0463: CPT

## 2022-04-03 PROCEDURE — 86703 HIV-1/HIV-2 1 RESULT ANTBDY: CPT

## 2022-04-03 PROCEDURE — 36415 COLL VENOUS BLD VENIPUNCTURE: CPT

## 2022-04-03 PROCEDURE — U0005: CPT

## 2022-04-03 PROCEDURE — 86900 BLOOD TYPING SEROLOGIC ABO: CPT

## 2022-04-03 PROCEDURE — 86901 BLOOD TYPING SEROLOGIC RH(D): CPT

## 2022-04-03 PROCEDURE — 85018 HEMOGLOBIN: CPT

## 2022-04-03 PROCEDURE — 59025 FETAL NON-STRESS TEST: CPT

## 2022-04-03 PROCEDURE — 86769 SARS-COV-2 COVID-19 ANTIBODY: CPT

## 2022-04-03 PROCEDURE — U0003: CPT

## 2022-04-03 PROCEDURE — 85025 COMPLETE CBC W/AUTO DIFF WBC: CPT

## 2022-04-03 PROCEDURE — 85014 HEMATOCRIT: CPT

## 2022-04-03 PROCEDURE — 86780 TREPONEMA PALLIDUM: CPT

## 2022-04-03 PROCEDURE — 87389 HIV-1 AG W/HIV-1&-2 AB AG IA: CPT

## 2022-04-03 PROCEDURE — 59050 FETAL MONITOR W/REPORT: CPT

## 2022-04-03 PROCEDURE — 86765 RUBEOLA ANTIBODY: CPT

## 2022-04-03 RX ORDER — ACETAMINOPHEN 500 MG
2 TABLET ORAL
Qty: 120 | Refills: 0
Start: 2022-04-03 | End: 2022-04-17

## 2022-04-03 RX ORDER — IBUPROFEN 200 MG
1 TABLET ORAL
Qty: 60 | Refills: 0
Start: 2022-04-03 | End: 2022-04-17

## 2022-04-03 NOTE — PROGRESS NOTE ADULT - SUBJECTIVE AND OBJECTIVE BOX
STEPHANY ALLAN is a 21y  s/p   PPD2    SUBJECTIVE:  Patient has no complaints, no acute events overnight.  Pain is well controlled with PRN pain medication.   She is ambulating, voiding, tolerating PO. Denies N/V.  minimal lochia.    She is dual feeding.   + flatus/ -BM    OBJECTIVE:  Physical exam:  General: AOx3, NAD.  Heart: Regular, rate, and rhythm  Lungs: CTAB  Abdomen: Soft, appropriately tender to palpitation, firm uterine fundus at umbilicus.  Vaginal: minimal blood on pad, no bleeding on palpation of fundus  Ext: No DVT signs, warm extremities.    Vital Signs Last 24 Hrs  T(C): 36.9 (2022 15:47), Max: 36.9 (2022 15:47)  T(F): 98.5 (2022 15:47), Max: 98.5 (2022 15:47)  HR: 75 (2022 15:47) (75 - 75)  BP: 116/76 (2022 15:47) (116/76 - 116/76)  BP(mean): --  RR: 16 (2022 15:47) (16 - 16)  SpO2: 99% (2022 15:47) (99% - 99%)    LABS:                        11.5   11.00 )-----------( 232      ( 2022 03:00 )             35.1                         9.6    x     )-----------( x        ( 2022 05:23 )             30.1       
STEPHANY ALLAN is a 21y  s/p   PPD1    SUBJECTIVE:  Patient has no complaints, no acute events overnight.  Pain is well controlled with PRN pain medication.   She is ambulating, voiding, tolerating PO. Denies N/V.  minimal lochia.      OBJECTIVE:  Physical exam:  General: AOx3, NAD.  Heart: Regular, rate, and rhythm  Lungs: CTAB  Abdomen: Soft, appropriately tender to palpitation, firm uterine fundus at umbilicus.  Vaginal: minimal blood on pad, no bleeding on palpation of fundus  Ext: No DVT signs, warm extremities.    Vital Signs Last 24 Hrs  T(C): 37 (2022 16:00), Max: 37.2 (2022 05:30)  T(F): 98.6 (2022 16:00), Max: 99 (2022 05:30)  HR: 62 (2022 16:00) (62 - 134)  BP: 110/71 (2022 16:00) (103/62 - 121/61)  BP(mean): --  RR: 18 (2022 16:00) (14 - 18)  SpO2: 99% (2022 16:00) (97% - 99%)    LABS:                        11.5   11.00 )-----------( 232      ( 2022 03:00 )             35.1

## 2022-04-03 NOTE — PROGRESS NOTE ADULT - ASSESSMENT
A/P: Patient is a 21y  s/p   PPD2  -  doing well postpartum  - Hgb 11.5 > 9.6  - Pain: well controlled on PO pain meds  - GI: Regular diet  - : voiding  - DVT prophylaxis: ambulate  - Dispo: Anticipate discharge today pending attending approval.

## 2022-04-04 NOTE — CDI QUERY NOTE - NSCDIOTHERTXTBX_GEN_ALL_CORE_HH
H&P- Pregnancy course:  Anemia, on iron    s/p normal spontaneous vaginal delivery.    OB Delivery summary-   Blood Loss:  · Estimated / Quantitative Blood Loss (mL):  89 mL    Hemoglobin levels:  Hemoglobin: 9.6 g/dL *L* [11.5 - 15.5] (04-02-22)  Hemoglobin: 11.5 g/dL [11.5 - 15.5] (04-01-22)    Is there a diagnosis associated with above findings?    1) Acute blood loss anemia  2) Other (please specify)  3) Not clinically significant

## 2023-01-01 NOTE — ED ADULT TRIAGE NOTE - PATIENT ON (OXYGEN DELIVERY METHOD)
room air - Follow-up with your pediatrician within 48 hours of discharge.   Routine Home Care Instructions:  - Please call us for help if you feel sad, blue or overwhelmed for more than a few days after discharge    - Umbilical cord care:        - Please keep your baby's cord clean and dry (do not apply alcohol)        - Please keep your baby's diaper below the umbilical cord until it has fallen off (~10-14 days)        - Please do not submerge your baby in a bath until the cord has fallen off (sponge bath instead)    - Continue feeding your child at least every 3 hours. Wake baby to feed if needed.     Please contact your pediatrician and return to the hospital if you notice any of the following:   - Fever  (T > 100.4)  - Reduced amount of wet diapers (< 5-6 per day) or no wet diaper in 12 hours  - Increased fussiness, irritability, or crying inconsolably  - Lethargy (excessively sleepy, difficult to arouse)  - Breathing difficulties (noisy breathing, breathing fast, using belly and neck muscles to breath)  - Changes in the baby’s color (yellow, blue, pale, gray)  - Seizure or loss of consciousness

## 2023-08-01 NOTE — OB PROVIDER H&P - VDRL/RPR: DATE, OB PROFILE
[Follow-Up Visit] : a follow-up visit for [Knee Pain] : knee pain [Home] : at home, [unfilled] , at the time of the visit. [Medical Office: ([facility])___] : at the medical office located in  [Patient] : the patient [This encounter was initiated by telehealth (audio with video) and converted to telephone (audio only) due to technical difficulties.] : This encounter was initiated by telehealth (audio with video) and converted to telephone (audio only) due to technical difficulties. 19-Jan-2022